# Patient Record
(demographics unavailable — no encounter records)

---

## 2018-07-13 NOTE — EKG
[place] [address]
Phone:  [phone]                     ELECTROCARDIOGRAM REPORT      
_______________________________________________________________________________
 
Name:       KVNG VILLEDA               Room:           [address] [place]  
[facility]#:  [id number]      Account #:      [id number]  
Admission:  18     Attend Phys:    Betsy Fu MD 
Discharge:               Date of Birth:  51  
         Report #: [id number]
    [id number]
_______________________________________________________________________________
THIS REPORT FOR:  //name//                      
 
                         [facility] ED
                                       
Test Date:    2018               Test Time:    16:18:44
Pat Name:     KVNG VILLEDA           Department:   
Patient ID:   [id number]            Room:          
Gender:       F                        Technician:   CAMERON HERNANDEZ
:          1951               Requested By: Ahmet Butler
Order Number: [id number] MD:   Erick Ann
                                 Measurements
Intervals                              Axis          
Rate:         82                       P:            65
WY:           123                      QRS:          84
QRSD:         131                      T:            24
QT:           394                                    
QTc:          461                                    
                           Interpretive Statements
Sinus rhythm
Left atrial enlargement
Right bundle branch block
No previous ECG available for comparison
 
Electronically Signed On 2018 10:42:40 CDT by Erick Ann
[email or web address]
 
 
 
 
 
 
 
 
 
 
 
 
 
 
 
 
 
 
  <ELECTRONICALLY SIGNED>
                                           By: Erick Ann MD, [facility]      
  18     1042
D: 18 1618   _____________________________________
T: 18   Erick Ann MD, FACC        /EPI

## 2018-11-01 NOTE — EKG
[place] [address]
Phone:  [phone]                     ELECTROCARDIOGRAM REPORT      
_______________________________________________________________________________
 
Name:       KVNG VILLEDA               Room:           [address]    ADM [place]  
.R.#:  [id number]      Account #:      [id number]  
Admission:  18     Attend Phys:    LORI Summers
Discharge:               Date of Birth:  51  
         Report #: [id number]
    [id number]
_______________________________________________________________________________
THIS REPORT FOR:  //name//                      
 
                         [facility] ED
                                       
Test Date:    2018               Test Time:    13:06:52
Pat Name:     KVNG VILLEDA           Department:   
Patient ID:   [id number]            Room:         [facility]
Gender:       F                        Technician:   LORI CADENA
:          1951               Requested By: Kurt Linda
Order Number: [id number] MD:   Ron Benson
                                 Measurements
Intervals                              Axis          
Rate:         77                       P:            
TX:                                    QRS:          83
QRSD:         129                      T:            19
QT:           404                                    
QTc:          458                                    
                           Interpretive Statements
Atrial fibrillation
Right bundle branch block
Compared to ECG 2018 16:18:44
Sinus rhythm no longer present
Atrial abnormality no longer present
 
Electronically Signed On 2018 17:06:01 CDT by Ron Benson
[email or web address]
 
 
 
 
 
 
 
 
 
 
 
 
 
 
 
 
 
  <ELECTRONICALLY SIGNED>
                                           By: Ron Benson MD, [facility]   
  18     1706
D: 18 1306   _____________________________________
T: 18 1306   Ron Benson MD, [facility]     /EPI

## 2018-11-02 NOTE — NUR
SW met with pt to complete initial assessment, introduce self, and SW role.
Pt alert, oriented.  Pt lives at home with her friends.  Pt has hx of HH and
SNF at [facility].  Pt has a nebulizer through [facility].  Pt does not have oxygen at
home but pt says she thinks she needs it.  SW sent information to [facility] and
if ordered at [place], SW explained to pt that SW could assist with ordering oxygen
for home if pt does qualify. Pt does not anticipate any other needs at this
time.

## 2018-11-02 NOTE — NUR
PATIENT HAS BEEN ALERT AND ORIENTED TODAY, VERY PLEASANT. UP AD MARCUS IN ROOM,
CALL LIGHT IS IN REACH. HAS HAD NO COMMPLAINTS OF PAIN TODAY. VITAL SIGNS HAVE
BEEN STABLE ON 2 LITERS OF OXYGEN. WILL CONTINUE TO MONITOR.

## 2018-11-02 NOTE — NUR
ASSUMED PATIENT CARE AT 1900.  PATIENT ALERT AND ORIENTED TIMES FOUR.  NO
COMPLAINTS OF PAIN OR DISCOMFORT NOTED THROUGHOUT SHIFT.  IV PATENT.  REMAINS
ON O2 2L VIA NC.  PATIENT EDUCATION DONE ON THE EFFECTS OF SOLU-MEDROL ON
BLOOD SUGARS, ALAYING PATIENT CONCERNS.  PATIENT EDUCATION CONTINUED WITH
MEDICATIONS AND THEIR SIDE EFFECTS.  UP AD MARCUS IN ROOM.  RN ASSESSMENT AND
HOURLY ROUNDING COMPLETED AS DOCUMENTED.

## 2018-11-03 NOTE — NUR
PT SLEPT MOST OF SHIFT. ASSESSMENT AS DOCUMENTED. MEDS GIVEN PER E-MAR. IV
PATENT. PT REMAINED ON 2L NC THIS SHIFT. NO REPORTS OF PAIN. NO CONCERNS AT
THIS TIME, WILL CONTINUE WITH PLAN OF CARE.

## 2018-11-03 NOTE — NUR
Pt to dc to home today and will need home o2. CM will referral, o2 order, and
ex ox to [facility] once available, asked for tank to be delievered to Pt's room.

## 2018-11-03 NOTE — NUR
PATIENT HAS BEEN ALERT AND ORIENTED TODAY VERY PLEASANT. UP AD MARCUS IN ROOM,
VITAL SIGNS STABLE ON 2 LITERS OF OXYGEN. NO COMPLAINTS OF PAIN TODAY. PATIENT
IS BEING DISCHARGED TO HOME WITH HOME OXYGEN TO USE DURING WITH ACTIVITY.
DISCHARGE INSTRUCTIONS AND PRESCRIPTIONS GIVEN TO PATIENT, QUESTIONS ANSWERED
FOR PATIENT. LEFT VIA WHEELCHAIR TO HOME.